# Patient Record
Sex: MALE | NOT HISPANIC OR LATINO | URBAN - METROPOLITAN AREA
[De-identification: names, ages, dates, MRNs, and addresses within clinical notes are randomized per-mention and may not be internally consistent; named-entity substitution may affect disease eponyms.]

---

## 2018-12-24 ENCOUNTER — OFFICE VISIT (OUTPATIENT)
Dept: FAMILY MEDICINE | Facility: CLINIC | Age: 53
End: 2018-12-24
Payer: COMMERCIAL

## 2018-12-24 VITALS
HEIGHT: 68 IN | TEMPERATURE: 97.7 F | SYSTOLIC BLOOD PRESSURE: 106 MMHG | HEART RATE: 67 BPM | DIASTOLIC BLOOD PRESSURE: 71 MMHG | WEIGHT: 212 LBS | BODY MASS INDEX: 32.13 KG/M2

## 2018-12-24 DIAGNOSIS — H92.01 RIGHT EAR PAIN: ICD-10-CM

## 2018-12-24 DIAGNOSIS — H61.21 IMPACTED CERUMEN OF RIGHT EAR: ICD-10-CM

## 2018-12-24 DIAGNOSIS — M72.2 PLANTAR FASCIITIS: Primary | ICD-10-CM

## 2018-12-24 PROCEDURE — 99213 OFFICE O/P EST LOW 20 MIN: CPT | Mod: 25 | Performed by: FAMILY MEDICINE

## 2018-12-24 PROCEDURE — 69209 REMOVE IMPACTED EAR WAX UNI: CPT | Mod: RT | Performed by: FAMILY MEDICINE

## 2018-12-24 SDOH — HEALTH STABILITY: MENTAL HEALTH: HOW OFTEN DO YOU HAVE A DRINK CONTAINING ALCOHOL?: NEVER

## 2018-12-24 ASSESSMENT — MIFFLIN-ST. JEOR: SCORE: 1776.64

## 2018-12-24 NOTE — PROGRESS NOTES
SUBJECTIVE:   Catracho Miranda is a 53 year old male who presents to clinic today for the following health issues:       Left heel pain and right foot pain  Pain in ears when chewing    none    Problem list and histories reviewed & adjusted, as indicated.  Additional history: as documented         Reviewed and updated as needed this visit by clinical staff  Tobacco  Allergies  Meds  Med Hx  Surg Hx  Fam Hx  Soc Hx      Reviewed and updated as needed this visit by Provider          heel for 5 months, arch for 4 months    Overall getting worse      Ear problems for a few months also    Full physical not done     Mentation and affect are fine    No tremor of speech or extremity    On exam both feet appear normal    No swelling/ discoloration/ deformity    He is somewhat tender on bottom of left feel and proximal plantar fascia    On right he is more sore distal plantar foot    Good senation, strength bilat    Dorsalis pedis fine bilat    Left tympanic membrane and canal okay, minimal wax  On right, tympanic membrane not seen due to large amount of dried hard wax packed deep into canal  Patient wanted this out if possible  With consent, used gentle irrigation to attempt to remove the wax.  Only got a little bit out.  Still could not see much of the tympanic membrane.  No complications.    Nasal and oral mucosa okay    No sinus/ submandib tenderness    Sclera/ conj clear    No tenderness over tmj; good jaw movement bilat. No clicking.     ASSESSMENT / PLAN:  (M72.2) Plantar fasciitis  (primary encounter diagnosis)  Comment: discussed in detail.  Advised arch supports, streching with ball, and nsaids over the counter.    Plan: PODIATRY/FOOT & ANKLE SURGERY REFERRAL        Follow up with podiatry if symptoms not improving    (H61.21) Impacted cerumen of right ear  Comment: not able to remove all the cerumen  Plan: OTOLARYNGOLOGY REFERRAL        Patient to see ent     (H92.01) Right ear pain  Comment: ent  referral  Plan: OTOLARYNGOLOGY REFERRAL        Patient/ family to call and schedule      I reviewed the patient's medications, allergies, medical history, family history, and social history.    Orlando Nolasco MD

## 2018-12-24 NOTE — PATIENT INSTRUCTIONS
Use arch supports    Do some stretching of bottom of feet with ball    Occasional ibuprofen with food as needed ( reduces inflammation and helps pain)    If not better then see foot specialist podiatry    For the ear, see ENT ear nose throat specialist

## 2019-02-27 ENCOUNTER — TELEPHONE (OUTPATIENT)
Dept: FAMILY MEDICINE | Facility: CLINIC | Age: 54
End: 2019-02-27

## 2019-02-27 NOTE — LETTER
March 21, 2019    Catracho Miranda  [address]  [place]. [place]"" [id number]      Dear Catracho Miranda,     We have tried to contact you about your health, but have been unable to reach you.  Please call us as soon as possible so we can provide you with the best care possible.  We will continue to check in with you throughout the year to complete these items of care, if you are not able to complete these items at this time.  If you would like to complete the missing items for your care, please contact us at [phone].    We recommend the following:  -schedule a COLONOSCOPY to look for colon cancer (due every 10 years or 5 years in higher risk situations.)   Colon cancer is now the second leading cause of death in the [place] for both men and women and there are over 130,000 new cases and 50,000 deaths per year from colon cancer.  Colonoscopies can prevent 90-95% of these deaths.  Problem lesions can be removed before they ever become cancer.  This test is not only looking for cancer, but also getting rid of precancerious lesions.  If you do not wish to do a colonoscopy or cannot afford to do one, at this time, there is another option. It is called a FIT test.        Sincerely,     Your Care Team at [facility]/ Team 3

## 2019-02-27 NOTE — LETTER
February 27, 2019    Catracho Miranda  [address]  [facility] [address]    Dear Catracho    We care about your health and have reviewed your health plan. We have reviewed your medical conditions, medication list, and lab results and are making recommendations based on this review, to better manage your health.    You are in particular need of attention regarding:  - Scheduling a Colon Cancer Screening (Colonoscopy only) [phone]      Here is a list of Health Maintenance topics that are due now or due soon:  Health Maintenance Due   Topic Date Due     PREVENTIVE CARE VISIT  1965     HIV SCREEN (SYSTEM ASSIGNED)  11/18/1983     HEPATITIS C SCREENING  11/18/1983     LIPID SCREEN Q5 YR MALE (SYSTEM ASSIGNED)  11/18/2000     COLON CANCER SCREEN (SYSTEM ASSIGNED)  11/18/2015     ZOSTER IMMUNIZATION (1 of 2) 11/18/2015     INFLUENZA VACCINE (1) 09/01/2018     We will be calling you in the next couple of weeks to help you schedule any appointments that are needed.  Please call us at [phone] (or use [employer]) to address the above recommendations.     Thank you for trusting [facility] and we appreciate the opportunity to serve you.  We look forward to supporting your healthcare needs in the future.    Healthy Regards,    Your care team

## 2019-02-27 NOTE — TELEPHONE ENCOUNTER
Panel Management Review      Patient has the following on his problem list: None      Composite cancer screening  Chart review shows that this patient is due/due soon for the following Colonoscopy  Summary:    Patient is due/failing the following:   COLONOSCOPY    Action needed:   Patient needs referral/order: Colonoscopy order pended    Type of outreach:    Sent letter. 02/27/2019    Questions for provider review:    None                                                                                                                                    Yasmine Emerson [facility]       Chart routed to Care Team .

## 2019-03-21 NOTE — TELEPHONE ENCOUNTER
The phone number in the chart is not correct for patient. I will mail a final letter  Yasmine Emerson CMA

## 2023-02-24 ENCOUNTER — OFFICE VISIT (OUTPATIENT)
Dept: UROLOGY | Facility: CLINIC | Age: 58
End: 2023-02-24
Attending: UROLOGY
Payer: COMMERCIAL

## 2023-02-24 VITALS — HEART RATE: 75 BPM | SYSTOLIC BLOOD PRESSURE: 120 MMHG | OXYGEN SATURATION: 99 % | DIASTOLIC BLOOD PRESSURE: 78 MMHG

## 2023-02-24 DIAGNOSIS — R35.1 NOCTURIA: Primary | ICD-10-CM

## 2023-02-24 DIAGNOSIS — N40.1 BENIGN PROSTATIC HYPERPLASIA WITH LOWER URINARY TRACT SYMPTOMS, SYMPTOM DETAILS UNSPECIFIED: ICD-10-CM

## 2023-02-24 PROCEDURE — 51798 US URINE CAPACITY MEASURE: CPT | Performed by: UROLOGY

## 2023-02-24 PROCEDURE — 99203 OFFICE O/P NEW LOW 30 MIN: CPT | Mod: 25 | Performed by: UROLOGY

## 2023-02-24 RX ORDER — TRIAMCINOLONE ACETONIDE 1 MG/G
CREAM TOPICAL
COMMUNITY
Start: 2023-02-09

## 2023-02-24 RX ORDER — MELOXICAM 15 MG/1
15 TABLET ORAL
COMMUNITY
Start: 2022-12-11

## 2023-02-24 RX ORDER — ALLOPURINOL 300 MG/1
300 TABLET ORAL
COMMUNITY
Start: 2021-06-09

## 2023-02-24 RX ORDER — ESCITALOPRAM OXALATE 10 MG/1
TABLET ORAL
COMMUNITY
Start: 2022-11-12

## 2023-02-24 RX ORDER — OXYBUTYNIN CHLORIDE 10 MG/1
10 TABLET, EXTENDED RELEASE ORAL DAILY
Qty: 90 TABLET | Refills: 3 | Status: SHIPPED | OUTPATIENT
Start: 2023-02-24 | End: 2023-12-05

## 2023-02-24 RX ORDER — ERGOCALCIFEROL 1.25 MG/1
CAPSULE, LIQUID FILLED ORAL
COMMUNITY
Start: 2022-05-20

## 2023-02-24 RX ORDER — TAMSULOSIN HYDROCHLORIDE 0.4 MG/1
CAPSULE ORAL
COMMUNITY
Start: 2022-11-12

## 2023-02-24 RX ORDER — ASPIRIN 81 MG/1
TABLET, COATED ORAL
COMMUNITY
Start: 2023-02-09

## 2023-02-24 RX ORDER — TAMSULOSIN HYDROCHLORIDE 0.4 MG/1
0.4 CAPSULE ORAL AT BEDTIME
Qty: 90 CAPSULE | Refills: 3 | Status: SHIPPED | OUTPATIENT
Start: 2023-02-24 | End: 2023-12-05

## 2023-02-24 NOTE — PROGRESS NOTES
S: Catracho Miranda is a pleasant  57 year old male who was requested to be seen by  Harriet Julien for a consult with regard to patient's urinary complaints.  Patient complains of Hesitancy in starting urinary stream, Sensation of incomplete emptying, Strain to Urinate, Nocturia x 5, Urgency, Frequency and weak urinary stream.  He has no history of elevated PSA.  Symptoms have been on going for   2 years(s).  Seems to be worsened over time.  His recent PSA was found to be 0.72.  His AUA Symptom Score:  26.  He has tried flomax without much improvements.  He has mild sleep apnea on recent sleep study.  Info obtained through [employer] .    Current Outpatient Medications   Medication Sig Dispense Refill     allopurinol (ZYLOPRIM) 300 MG tablet Take 300 mg by mouth       ASPIRIN LOW DOSE 81 MG EC tablet        escitalopram (LEXAPRO) 10 MG tablet        meloxicam (MOBIC) 15 MG tablet Take 15 mg by mouth       metFORMIN (GLUCOPHAGE) 500 MG tablet metformin 500 mg tablet   Take 1 tablet(s) twice a day by oral route as directed for 90 days.   TAKE 1 TABLET BY MOUTH TWICE DAILy       oxybutynin ER (DITROPAN XL) 10 MG 24 hr tablet Take 1 tablet (10 mg) by mouth daily 90 tablet 3     tamsulosin (FLOMAX) 0.4 MG capsule        tamsulosin (FLOMAX) 0.4 MG capsule Take 1 capsule (0.4 mg) by mouth At Bedtime 90 capsule 3     triamcinolone (KENALOG) 0.1 % external cream        vitamin D2 (ERGOCALCIFEROL) 14626 units (1250 mcg) capsule TAKE 1 CAPSULE BY MOUTH 1 TIME WEEKLY. REPEAT LABS AFTER 3 MONTHS       No Known Allergies  No past medical history on file.  No past surgical history on file.   No family history on file.  He does not have a family history of prostate cancer.  Social History     Socioeconomic History     Marital status: Patient Declined     Spouse name: None     Number of children: None     Years of education: None     Highest education level: None   Tobacco Use     Smoking status: Never     Passive  exposure: Never     Smokeless tobacco: Never        REVIEW OF SYSTEMS  =================  C: NEGATIVE for fever, chills, change in weight  I: NEGATIVE for worrisome rashes, moles or lesions  E/M: NEGATIVE for ear, mouth and throat problems  R: NEGATIVE for significant cough or SHORTNESS OF BREATH  CV:  NEGATIVE for chest pain, palpitations or peripheral edema  GI: NEGATIVE for nausea, abdominal pain, heartburn, or change in bowel habits  NEURO: NEGATIVE numbness/weakness  : see HPI  PSYCH: NEGATIVE depression/anxiety  LYmph: no new enlarged lymph nodes  Ortho: no new trauma/movements           O: Exam:/78 (BP Location: Right arm, Patient Position: Chair, Cuff Size: Adult Large)   Pulse 75   SpO2 99%    Constitutional: healthy, alert and no distress  Cardiovascular: negative, PMI normal.   Respiratory: negative, no evidence of respiratory distress  Gastrointestinal: Abdomen soft, non-tender. BS normal. No masses, organomegaly  : penis no discharge.  Testis no masses.  No scrotal skin lesion.  Prostate 50 gm smooth no nodule.  PVR < 50 ml.  Musculoskeletal: extremities normal- no gross deformities noted, gait normal and normal muscle tone  Skin: no suspicious lesions or rashes  Neurologic: Alert and oriented  Psychiatric: mentation appears normal. and affect normal/bright  Hematologic/Lymphatic/Immunologic: normal ant/post cervical, axillary, supraclavicular and inguinal nodes    Assessment/Plan:   (R35.1) Nocturia  (primary encounter diagnosis)  Comment:    Plan:  ? Sleep apnea vs BPH vs OAB            Trial of ditropan - side effect discussed            Discussed CPAP    (N40.1) Benign prostatic hyperplasia with lower urinary tract symptoms, symptom details unspecified  Comment:    Plan: cont with flomax    rtc in 2 months for recheck.

## 2023-02-24 NOTE — PROGRESS NOTES
1 coffee  2 water  1 soda  .Bladder scan performed 8ml  detected in bladder. Courtney Barnes, CMA

## 2023-03-14 ENCOUNTER — TELEPHONE (OUTPATIENT)
Dept: UROLOGY | Facility: CLINIC | Age: 58
End: 2023-03-14
Payer: COMMERCIAL

## 2023-03-14 NOTE — TELEPHONE ENCOUNTER
Called and left msg with [facility]  service for pt to call back.     Enedina PRIDE RN Specialty Triage 3/14/2023 3:56 PM

## 2023-03-14 NOTE — TELEPHONE ENCOUNTER
Jackson, patient's personal  calling on patient's behalf.  He stated that patient was prescribed a new med by urology but has tried getting the med from the pharmacy a few times now without any success.  Unsure what the issue is.   He asked writer to verify the pharmacy on file.  Explained to him that there is no consent on file to speak with him   Explained that writer will forward his message to the urology team to follow up but they may need to call patient directly.  He verbalized understanding    Jens Marvin RN  [facility]

## 2023-03-22 NOTE — TELEPHONE ENCOUNTER
Writer called and left a 2nd message with  ID [id number] to verify pharmacy and ensure he received his medications.     Writer called [facility] pharmacy and spoke with   Oxybutynin and Flomax was picked up March 7th.    Marisabel LIM RN Urology 3/22/2023 10:15 AM

## 2023-12-05 ENCOUNTER — OFFICE VISIT (OUTPATIENT)
Dept: UROLOGY | Facility: CLINIC | Age: 58
End: 2023-12-05
Payer: COMMERCIAL

## 2023-12-05 DIAGNOSIS — R35.1 NOCTURIA: Primary | ICD-10-CM

## 2023-12-05 DIAGNOSIS — N40.1 BENIGN PROSTATIC HYPERPLASIA WITH LOWER URINARY TRACT SYMPTOMS, SYMPTOM DETAILS UNSPECIFIED: ICD-10-CM

## 2023-12-05 PROCEDURE — 51798 US URINE CAPACITY MEASURE: CPT | Performed by: UROLOGY

## 2023-12-05 PROCEDURE — 99214 OFFICE O/P EST MOD 30 MIN: CPT | Mod: 25 | Performed by: UROLOGY

## 2023-12-05 RX ORDER — TAMSULOSIN HYDROCHLORIDE 0.4 MG/1
0.4 CAPSULE ORAL AT BEDTIME
Qty: 90 CAPSULE | Refills: 3 | Status: SHIPPED | OUTPATIENT
Start: 2023-12-05

## 2023-12-05 RX ORDER — OXYBUTYNIN CHLORIDE 10 MG/1
10 TABLET, EXTENDED RELEASE ORAL DAILY
Qty: 90 TABLET | Refills: 3 | Status: SHIPPED | OUTPATIENT
Start: 2023-12-05

## 2023-12-05 NOTE — PATIENT INSTRUCTIONS
Dr. Ferguson would like for you to come to the clinic for an office visit where we will complete a uroflow and cystoscopy.  Please come prepared to urinate.  We request that you refrain from urinating roughly 2 hours prior to the appointment so that we are able to complete a study called Uroflowmetry. This measures the amount and speed of urine you void from your bladder. You pee into a funnel. It s attached to a computer that records your urine flow over time. The amount of urine left in your bladder after you pee may also be measured right after this test.    We will then take a look inside of your bladder (cystoscopy).  Cystoscopy  Cystoscopy is a procedure that lets your healthcare provider look directly inside your urethra and bladder. It can be used to:  Help diagnose a problem with your urethra, bladder, or kidneys.  Take a sample (biopsy) of bladder or urethral tissue.  Treat certain problems (such as removing kidney stones).  Place a tube (stent) to bypass a blockage.  Take special X-rays of the kidneys.  Based on the findings, your provider may advise other tests or treatments.  What is a cystoscope?  A cystoscope is a telescope-like tube that contains lenses and small glass wires that make bright light (fiberoptics). The cystoscope may be straight and rigid. Or it may be flexible to bend around curves in the urethra. The healthcare provider may look directly into the cystoscope, or project the image onto a screen.    Getting ready  Ask your healthcare provider if you should stop taking any medicines before the procedure.  Follow any directions you're given for not eating or drinking before the procedure.  Follow any other instructions your healthcare provider gives you.     Tell your healthcare provider before the exam if you:  Take any medicines, such as aspirin or blood thinners. This also includes any over-the-counter and prescription medicines, vitamins, herbs, and other supplements.  Have allergies to  any medicines  Are pregnant or think you may be pregnant     During the procedure  Cystoscopy is done in the healthcare provider s office, surgery center, or hospital. The doctor and a nurse are present during the procedure. It takes only a few minutes. It takes longer if a biopsy, X-ray, or treatment needs to be done.  During the procedure:  You lie on an exam table on your back, knees bent and legs apart. You're covered with a drape.  Your urethra and the area around it are washed. Anesthetic jelly may be applied to numb the urethra. Other pain medicine is often not needed. In some cases, you may be offered a mild sedative to help you relax. If a more extensive procedure is to be done, such as a biopsy or kidney stone removal, general anesthesia may be needed. Often a one-time antibiotic is given.  The cystoscope is inserted. A sterile fluid is put into the bladder to expand it. You may feel pressure from this fluid.  When the procedure is done, the cystoscope is removed.  After the procedure  If you had a sedative, general anesthesia, or spinal anesthesia, you must have someone drive you home. Once you re home:  Drink plenty of fluids.  You may have burning or light bleeding when you pee. This is normal.  Medicines may be prescribed to ease any mild pain or prevent infection. Take these as directed.  When to call your healthcare provider  Call your healthcare provider if you have any of the following after the procedure:  Heavy bleeding or blood clots  Burning that lasts more than 1 day  Fever of   100  F  ( 38 C ) or higher, or as directed by your provider  Trouble peeing     Chante last reviewed this educational content on 10/1/2021

## 2023-12-05 NOTE — PROGRESS NOTES
Patient presents to the clinic today (with in person ) for follow up for prostate enlargement and medication check. Patient relays the following information: On Ditropan and Flomax. Pt did use medication and has been off it for 2 months.   Nocturia x4-5.   Not straining to urinate.  When he gets up at night, he has very small amount of urine.    PVR obtained.  Bladder Scan performed. 28 ml maximum residual urine detected after 3 scans. MD informed.   Marisabel LIM RN Urology 12/5/2023 2:49 PM

## 2023-12-05 NOTE — PROGRESS NOTES
No chief complaint on file.      Catracho Miranda is a 58 year old male who presents today for follow up of No chief complaint on file.   Follow up for LUTS/nocturia.  He is on flomax/ditropan.  Meds were initially helping but no longer.  He still gets up several times at night. Flow is variable.    Current Outpatient Medications   Medication Sig Dispense Refill    allopurinol (ZYLOPRIM) 300 MG tablet Take 300 mg by mouth      ASPIRIN LOW DOSE 81 MG EC tablet       escitalopram (LEXAPRO) 10 MG tablet       meloxicam (MOBIC) 15 MG tablet Take 15 mg by mouth      metFORMIN (GLUCOPHAGE) 500 MG tablet metformin 500 mg tablet   Take 1 tablet(s) twice a day by oral route as directed for 90 days.   TAKE 1 TABLET BY MOUTH TWICE DAILy      oxyBUTYnin ER (DITROPAN XL) 10 MG 24 hr tablet Take 1 tablet (10 mg) by mouth daily 90 tablet 3    tamsulosin (FLOMAX) 0.4 MG capsule Take 1 capsule (0.4 mg) by mouth at bedtime 90 capsule 3    tamsulosin (FLOMAX) 0.4 MG capsule       triamcinolone (KENALOG) 0.1 % external cream       vitamin D2 (ERGOCALCIFEROL) 36581 units (1250 mcg) capsule TAKE 1 CAPSULE BY MOUTH 1 TIME WEEKLY. REPEAT LABS AFTER 3 MONTHS       No Known Allergies   No past medical history on file.  No past surgical history on file.  No family history on file.  Social History     Socioeconomic History    Marital status: Patient Declined   Tobacco Use    Smoking status: Never     Passive exposure: Never    Smokeless tobacco: Never   Substance and Sexual Activity    Alcohol use: No    Drug use: No    Sexual activity: Yes     Partners: Female   Social History Narrative    ** Merged History Encounter **            REVIEW OF SYSTEMS  =================  C: NEGATIVE for fever, chills, change in weight  I: NEGATIVE for worrisome rashes, moles or lesions  E/M: NEGATIVE for ear, mouth and throat problems  R: NEGATIVE for significant cough or SHORTNESS OF BREATH  CV:  NEGATIVE for chest pain, palpitations or peripheral edema  GI:  NEGATIVE for nausea, abdominal pain, heartburn, or change in bowel habits  NEURO: NEGATIVE numbness/weakness  : see HPI  PSYCH: NEGATIVE depression/anxiety  LYmph: no new enlarged lymph nodes  Ortho: no new trauma/movements    Physical Exam:  There were no vitals taken for this visit.    GENERAL: healthy, alert and no distress  EYES: Eyes grossly normal to inspection, conjunctivae and sclerae normal  RESP: no audible wheeze, cough, or visible cyanosis.  No visible retractions or increased work of breathing.  Able to speak fully in complete sentences.  NEURO: Cranial nerves grossly intact, mentation intact and speech normal  PSYCH: mentation appears normal, affect normal/bright, judgement and insight intact, normal speech and appearance well-groomed    Assessment/Plan:   (R35.1) Nocturia  (primary encounter diagnosis)  Comment:    Plan: MEASURE POST-VOID RESIDUAL URINE/BLADDER         CAPACITY, US NON-IMAGING (05271), oxyBUTYnin ER        (DITROPAN XL) 10 MG 24 hr tablet, tamsulosin         (FLOMAX) 0.4 MG capsule             (N40.1) Benign prostatic hyperplasia with lower urinary tract symptoms, symptom details unspecified  Comment:    Plan: MEASURE POST-VOID RESIDUAL URINE/BLADDER         CAPACITY, US NON-IMAGING (69863), oxyBUTYnin ER        (DITROPAN XL) 10 MG 24 hr tablet, tamsulosin         (FLOMAX) 0.4 MG capsule               Symptoms not much better with meds.  Rtc for cysto / uroflow next.  Info obtained through [employer] .

## 2024-11-11 DIAGNOSIS — R69 DIAGNOSIS UNKNOWN: Primary | ICD-10-CM
